# Patient Record
Sex: FEMALE | URBAN - METROPOLITAN AREA
[De-identification: names, ages, dates, MRNs, and addresses within clinical notes are randomized per-mention and may not be internally consistent; named-entity substitution may affect disease eponyms.]

---

## 2022-02-08 ENCOUNTER — TELEPHONE ENCOUNTER (OUTPATIENT)
Dept: URBAN - METROPOLITAN AREA CLINIC 98 | Facility: CLINIC | Age: 42
End: 2022-02-08

## 2022-02-25 ENCOUNTER — DASHBOARD ENCOUNTERS (OUTPATIENT)
Age: 42
End: 2022-02-25

## 2022-02-25 ENCOUNTER — OFFICE VISIT (OUTPATIENT)
Dept: URBAN - METROPOLITAN AREA TELEHEALTH 2 | Facility: TELEHEALTH | Age: 42
End: 2022-02-25
Payer: COMMERCIAL

## 2022-02-25 DIAGNOSIS — K21.9 GERD WITHOUT ESOPHAGITIS: ICD-10-CM

## 2022-02-25 DIAGNOSIS — E66.9 OBESITY (BMI 30.0-34.9): ICD-10-CM

## 2022-02-25 PROBLEM — 266435005: Status: ACTIVE | Noted: 2022-02-25

## 2022-02-25 PROBLEM — 443371000124107: Status: ACTIVE | Noted: 2022-02-25

## 2022-02-25 PROCEDURE — 99203 OFFICE O/P NEW LOW 30 MIN: CPT | Performed by: INTERNAL MEDICINE

## 2022-02-25 RX ORDER — OMEPRAZOLE 20 MG/1
1 CAPSULE 30 MINUTES BEFORE MORNING MEAL CAPSULE, DELAYED RELEASE ORAL TWICE A DAY
Status: ACTIVE | COMMUNITY

## 2022-02-25 RX ORDER — GLUCOSAM/CHONDRO/HERB 149/HYAL 750-100 MG
1 CAPSULE TABLET ORAL ONCE A DAY
Status: ACTIVE | COMMUNITY

## 2022-02-25 RX ORDER — OXYCODONE HYDROCHLORIDE AND ACETAMINOPHEN 500 MG
1 TABLET TABLET ORAL ONCE A DAY
Status: ACTIVE | COMMUNITY

## 2022-02-25 RX ORDER — OMEPRAZOLE 40 MG/1
1 CAPSULE 30 MINUTES BEFORE MORNING MEAL CAPSULE, DELAYED RELEASE ORAL ONCE A DAY
Qty: 30 | Refills: 3 | OUTPATIENT
Start: 2022-02-25

## 2022-02-25 RX ORDER — BIOTIN 5 MG
1 CAPSULE CAPSULE ORAL ONCE A DAY
Status: ACTIVE | COMMUNITY

## 2022-02-25 RX ORDER — MAGNESIUM OXIDE/MAG AA CHELATE 300 MG
1 CAPSULE WITH A MEAL CAPSULE ORAL ONCE A DAY
Status: ACTIVE | COMMUNITY

## 2022-02-25 NOTE — HPI-TODAY'S VISIT:
This is a Telehealth OV to which patient has agreed to. Limitations of telehealth discussed; she understand and agrees to proceed. Patient's @ home during this virtual OV. 43 yo patient referred by Bal Guido MD for evaluation of chronic GERD. Copy of this consult sent to Dr. Guido. Patient has been experiencing several mos of heartburn, nocturnal coughig, frequent throat clearing, nocturnal choking episodes, wo dysphagia / odynophagia nor melenic stools. Much less daytime DMITRY sxs.  Has been on Omeprazole 20 mg q am, wo much improvement in her sxs. No anorexia. Current self-reported weight 210 lbs ( gained ~ 6 lbs / 1 yr ). Normal bm's, w occasional alternating mild constipation w loose, non-bloody stools NO constitutional sxs. No cardiorespiratory complaints. Had mild COVID-19 12/20 and she's fully COVID-19 vaccinated. No other complaints.

## 2022-02-28 ENCOUNTER — TELEPHONE ENCOUNTER (OUTPATIENT)
Dept: URBAN - METROPOLITAN AREA CLINIC 98 | Facility: CLINIC | Age: 42
End: 2022-02-28

## 2022-03-10 ENCOUNTER — OFFICE VISIT (OUTPATIENT)
Dept: URBAN - METROPOLITAN AREA CLINIC 80 | Facility: CLINIC | Age: 42
End: 2022-03-10

## 2022-03-18 ENCOUNTER — OFFICE VISIT (OUTPATIENT)
Dept: URBAN - METROPOLITAN AREA LAB 3 | Facility: LAB | Age: 42
End: 2022-03-18

## 2024-04-23 ENCOUNTER — APPOINTMENT (RX ONLY)
Dept: URBAN - METROPOLITAN AREA CLINIC 159 | Facility: CLINIC | Age: 44
Setting detail: DERMATOLOGY
End: 2024-04-23

## 2024-04-23 DIAGNOSIS — L91.8 OTHER HYPERTROPHIC DISORDERS OF THE SKIN: ICD-10-CM

## 2024-04-23 DIAGNOSIS — L81.4 OTHER MELANIN HYPERPIGMENTATION: ICD-10-CM | Status: STABLE

## 2024-04-23 DIAGNOSIS — D22 MELANOCYTIC NEVI: ICD-10-CM

## 2024-04-23 DIAGNOSIS — D485 NEOPLASM OF UNCERTAIN BEHAVIOR OF SKIN: ICD-10-CM | Status: WORSENING

## 2024-04-23 PROBLEM — D48.5 NEOPLASM OF UNCERTAIN BEHAVIOR OF SKIN: Status: ACTIVE | Noted: 2024-04-23

## 2024-04-23 PROBLEM — D22.61 MELANOCYTIC NEVI OF RIGHT UPPER LIMB, INCLUDING SHOULDER: Status: ACTIVE | Noted: 2024-04-23

## 2024-04-23 PROCEDURE — ? BENIGN DESTRUCTION COSMETIC

## 2024-04-23 PROCEDURE — ? BIOPSY BY SHAVE METHOD

## 2024-04-23 PROCEDURE — ? COUNSELING

## 2024-04-23 PROCEDURE — 11102 TANGNTL BX SKIN SINGLE LES: CPT

## 2024-04-23 PROCEDURE — 99203 OFFICE O/P NEW LOW 30 MIN: CPT | Mod: 25

## 2024-04-23 ASSESSMENT — LOCATION SIMPLE DESCRIPTION DERM
LOCATION SIMPLE: LEFT PRETIBIAL REGION
LOCATION SIMPLE: LEFT POSTERIOR AXILLA
LOCATION SIMPLE: RIGHT POSTERIOR AXILLA
LOCATION SIMPLE: LEFT ANKLE

## 2024-04-23 ASSESSMENT — LOCATION DETAILED DESCRIPTION DERM
LOCATION DETAILED: LEFT PROXIMAL PRETIBIAL REGION
LOCATION DETAILED: LEFT POSTERIOR AXILLA
LOCATION DETAILED: LEFT ANKLE
LOCATION DETAILED: RIGHT POSTERIOR AXILLA

## 2024-04-23 ASSESSMENT — LOCATION ZONE DERM
LOCATION ZONE: LEG
LOCATION ZONE: AXILLAE